# Patient Record
Sex: MALE
[De-identification: names, ages, dates, MRNs, and addresses within clinical notes are randomized per-mention and may not be internally consistent; named-entity substitution may affect disease eponyms.]

---

## 2021-10-22 ENCOUNTER — TRANSCRIPTION ENCOUNTER (OUTPATIENT)
Age: 41
End: 2021-10-22

## 2022-12-21 ENCOUNTER — APPOINTMENT (OUTPATIENT)
Dept: AFTER HOURS CARE | Facility: EMERGENCY ROOM | Age: 42
End: 2022-12-21

## 2022-12-21 DIAGNOSIS — R11.2 NAUSEA WITH VOMITING, UNSPECIFIED: ICD-10-CM

## 2022-12-21 PROBLEM — Z00.00 ENCOUNTER FOR PREVENTIVE HEALTH EXAMINATION: Status: ACTIVE | Noted: 2022-12-21

## 2022-12-21 PROCEDURE — 99204 OFFICE O/P NEW MOD 45 MIN: CPT | Mod: 95

## 2022-12-21 RX ORDER — ONDANSETRON 4 MG/1
4 TABLET ORAL EVERY 8 HOURS
Qty: 9 | Refills: 0 | Status: ACTIVE | COMMUNITY
Start: 2022-12-21 | End: 1900-01-01

## 2022-12-21 NOTE — PHYSICAL EXAM
[No Acute Distress] : no acute distress [Well-Appearing] : well-appearing [No Respiratory Distress] : no respiratory distress  [Soft] : abdomen soft [Non Tender] : non-tender [No Joint Swelling] : no joint swelling [No Rash] : no rash [Speech Grossly Normal] : speech grossly normal [Alert and Oriented x3] : oriented to person, place, and time [de-identified] : to rachelle guided self-palpation

## 2022-12-21 NOTE — REVIEW OF SYSTEMS
[Fever] : fever [Chills] : no chills [Night Sweats] : no night sweats [Vision Problems] : no vision problems [Postnasal Drip] : no postnasal drip [Nasal Discharge] : no nasal discharge [Sore Throat] : no sore throat [Chest Pain] : no chest pain [Palpitations] : no palpitations [Shortness Of Breath] : no shortness of breath [Cough] : no cough [Abdominal Pain] : no abdominal pain [Nausea] : nausea [Diarrhea] : diarrhea [Vomiting] : vomiting [Melena] : no melena [Dysuria] : no dysuria [Hematuria] : no hematuria [Joint Pain] : no joint pain [Skin Rash] : no skin rash [Headache] : no headache

## 2022-12-21 NOTE — HISTORY OF PRESENT ILLNESS
[Home] : at home, [unfilled] , at the time of the visit. [Other Location: e.g. Home (Enter Location, City,State)___] : at [unfilled] [Verbal consent obtained from patient] : the patient, [unfilled] [FreeTextEntry8] : 42y M w/ no PMHx presenting with 12h of nausea/vomiting and diarrhea. Patient states he had NBNB emesis x2 and has since been dry heaving with multiple episodes of loose stool. Also complains of fever to TMax 101F. Has been able to tolerate PO liquids. Denies recent travel, sock contacts, rash, joint pain, urinary symptoms, bloody stools or abdominal pain. States he ate dinner with wife who prepared sausage & onions, denies eating anything else different at dinner.

## 2022-12-21 NOTE — PLAN
[With new medications prescribed] : Treat in place: with new medications prescribed [FreeTextEntry1] : -Rx zofran\par -C/w oral hydration with water, pedialyte \par -Avoid fruits, dairy, spicy/fatty foods, caffeine\par  -Close follow-up with PMD\par -ED precautions discussed with patient

## 2022-12-21 NOTE — ASSESSMENT
[FreeTextEntry1] : Relatively well-appearing male likely gastroenteritis, no focal tenderness to self-guided palpation of abdomen, able to tolerate PO liquids, no current fever no bloody stools. Will rx zofran for symptomatic relief, increase fluid hydration, avoid dairy/raw fruits/spicy/fatty foods or caffeine. Rest, and close f/u with PMD.

## 2025-04-28 ENCOUNTER — NON-APPOINTMENT (OUTPATIENT)
Age: 45
End: 2025-04-28